# Patient Record
Sex: FEMALE | Race: WHITE
[De-identification: names, ages, dates, MRNs, and addresses within clinical notes are randomized per-mention and may not be internally consistent; named-entity substitution may affect disease eponyms.]

---

## 2019-06-16 ENCOUNTER — HOSPITAL ENCOUNTER (EMERGENCY)
Dept: HOSPITAL 25 - UCEAST | Age: 39
Discharge: HOME | End: 2019-06-16
Payer: COMMERCIAL

## 2019-06-16 VITALS — SYSTOLIC BLOOD PRESSURE: 118 MMHG | DIASTOLIC BLOOD PRESSURE: 77 MMHG

## 2019-06-16 DIAGNOSIS — R21: ICD-10-CM

## 2019-06-16 DIAGNOSIS — R50.9: Primary | ICD-10-CM

## 2019-06-16 DIAGNOSIS — Z88.1: ICD-10-CM

## 2019-06-16 PROCEDURE — 85025 COMPLETE CBC W/AUTO DIFF WBC: CPT

## 2019-06-16 PROCEDURE — 85060 BLOOD SMEAR INTERPRETATION: CPT

## 2019-06-16 PROCEDURE — 87798 DETECT AGENT NOS DNA AMP: CPT

## 2019-06-16 PROCEDURE — 87086 URINE CULTURE/COLONY COUNT: CPT

## 2019-06-16 PROCEDURE — 87651 STREP A DNA AMP PROBE: CPT

## 2019-06-16 PROCEDURE — 86618 LYME DISEASE ANTIBODY: CPT

## 2019-06-16 PROCEDURE — 86140 C-REACTIVE PROTEIN: CPT

## 2019-06-16 PROCEDURE — 99212 OFFICE O/P EST SF 10 MIN: CPT

## 2019-06-16 PROCEDURE — G0463 HOSPITAL OUTPT CLINIC VISIT: HCPCS

## 2019-06-16 PROCEDURE — 81003 URINALYSIS AUTO W/O SCOPE: CPT

## 2019-06-16 PROCEDURE — 36415 COLL VENOUS BLD VENIPUNCTURE: CPT

## 2019-06-16 NOTE — UC
HPI Febrile Illness





- HPI Summary


HPI Summary: 


39-year-old female comes in with a chief complaint of fever. about 3 days ago 

patient started with a fever she also had some posterior neck and head pain.  

Denies any rhinorrhea or sore throat or cough or chest congestion or burning 

with urination or abdominal pain or diarrhea or nausea or vomiting.  Fevers 

waxed and waned.  Patient has taken over-the-counter medications which do help 

the symptoms.  Headache at its worse is 7 out of 10 right now it's about 2 out 

of 10.  Denies any concern of abnormal vaginal discharge.  Patient noticed a 

rash today that developed on her abdomen and back.  She was feeling improved 

this morning and then the fever came back this evening.





- History of Current Complaint


Chief Complaint: UCGeneralIllness


Hx Last Menstrual Period: 6/11/19


Pain Intensity: 3





- Allergy/Home Medications


Allergies/Adverse Reactions: 


 Allergies











Allergy/AdvReac Type Severity Reaction Status Date / Time


 


sulfamethoxazole Allergy  BLUE LIPS Verified 06/16/19 20:46





[From Bactrim]     


 


trimethoprim [From Bactrim] Allergy  BLUE LIPS Verified 06/16/19 20:46











Home Medications: 


 Home Medications





Cholecalciferol TAB* [Vitamin D TAB*]  06/16/19 [History Confirmed 06/16/19]


Cyanocobalamin TAB* [Vitamin B12 TAB*]  06/16/19 [History]


Dm/PE/Acetaminophen/Doxylamine [Vicks Nyquil Severe Cold-Flu] 2 each PO ONCE 

PRN 06/16/19 [History Confirmed 06/16/19]


Iron  06/16/19 [History]











PMH/Surg Hx/FS Hx/Imm Hx


Previously Healthy: Yes





- Surgical History


Surgical History: Yes


Surgery Procedure, Year, and Place: BACK SURGERY- [facility].  AGE 5 - 

INGUINAL HERNIA REPAIR??





- Family History


Known Family History: Positive: Non-Contributory





- Social History


Alcohol Use: Occasionally


Substance Use Type: None


Smoking Status (MU): Never Smoked Tobacco





Review of Systems


All Other Systems Reviewed And Are Negative: Yes


Constitutional: Positive: Fever, Chills


Skin: Positive: Rash


Eyes: Positive: Negative


ENT: Positive: Negative


Respiratory: Positive: Negative


Cardiovascular: Positive: Negative


Gastrointestinal: Positive: Negative


Genitourinary: Positive: Negative


Motor: Positive: Negative


Neurovascular: Positive: Negative


Musculoskeletal: Positive: Myalgia


Neurological: Positive: Headache


Psychological: Positive: Negative


Is Patient Immunocompromised?: No





Physical Exam


Triage Information Reviewed: Yes


Appearance: No Pain Distress, Well-Nourished, Ill-Appearing - mild


Vital Signs: 


 Initial Vital Signs











Temp  100.8 F   06/16/19 20:41


 


Pulse  99   06/16/19 20:41


 


Resp  16   06/16/19 20:41


 


BP  118/77   06/16/19 20:41


 


Pulse Ox  100   06/16/19 20:41











Vital Signs Reviewed: Yes


Eye Exam: Normal


Eyes: Positive: Conjunctiva Clear, Other: - perrla/eomi,no photophobia


ENT: Positive: Pharynx normal, TMs normal


Neck: Positive: Supple, Nontender


Respiratory: Positive: Lungs clear, No respiratory distress


Cardiovascular: Positive: RRR


Abdomen Description: Positive: Nontender, Soft


Bowel Sounds: Positive: Present


Musculoskeletal Exam: Normal


Musculoskeletal: Positive: Strength Intact, ROM Intact


Neurological Exam: Normal


Neurological: Positive: Alert, Muscle Tone Normal


Psychological Exam: Normal


Psychological: Positive: Age Appropriate Behavior


Skin: Positive: Other - scattered flat erythematous 0.5 cm to 2cm patches of 

blanching rash on abd and back.





Course/Dx





- Course


Course Of Treatment: 


Patient has had an intermittent fever for 3 days.  Her neck pain is posterior 

on both sides and also her headache is occipital.  At worst it was a 7 out of 

10 now it's about a 2 out of 10.  Her neck is supple with full range of motion.

  There is minimal discomfort when she brings her chin to her chest.  She has 

no thoracic or lumbar back pain.  The headache is not circumferential.  We 

discussed the signs and symptoms of meningitis which clinically at this time 

the patient does not have.  I did let her know that if she develops signs of 

meningitis she should go to the emergency department right away.  The rash is 

blanching and not in any particular distribution it does not have central 

clearing however the possibility of Lyme disease.  We'll check a CRP CBC and 

Lyme screen.  We discussed symptomatic treatment.  We also discussed treatment 

with antibiotics.  At this time the plan is to treat with doxycycline and if 

her Lyme comes back positive she will continue it for a full 2 weeks.  

Otherwise follow-up as needed or if gets worse go the emergency department.





- Diagnoses


Provider Diagnosis: 


 Febrile illness, Rash








Discharge





- Sign-Out/Discharge


Documenting (check all that apply): Patient Departure


All imaging exams completed and their final reports reviewed: No Studies





- Discharge Plan


Condition: Stable


Disposition: HOME


Prescriptions: 


DOXYcycline CAP(*) [DOXYcycline 100MG CAP(*)] 100 mg PO BID #26 cap


Patient Education Materials:  Fever in Adults (ED), Acute Rash (ED)


Referrals: 


Rose Mary Mcwilliams MD [Primary Care Provider] - 


Additional Instructions: 


FOLLOW UP WITH YOUR DOCTOR IF NOT COMPLETELY IMPROVED.


GET RECHECKED SOONER IF YOUR CONDITION WORSENS OR ANY QUESTIONS OR CONCERNS.








- Billing Disposition and Condition


Condition: STABLE


Disposition: Home

## 2019-06-17 LAB
BASOPHILS # BLD AUTO: 0 10^3/UL (ref 0–0.2)
EOSINOPHIL # BLD AUTO: 0 10^3/UL (ref 0–0.6)
HCT VFR BLD AUTO: 40 % (ref 35–47)
HGB BLD-MCNC: 13.5 G/DL (ref 12–16)
LYMPHOCYTES # BLD AUTO: 1 10^3/UL (ref 1–4.8)
MCH RBC QN AUTO: 31 PG (ref 27–31)
MCHC RBC AUTO-ENTMCNC: 34 G/DL (ref 31–36)
MCV RBC AUTO: 91 FL (ref 80–97)
MONOCYTES # BLD AUTO: 0.5 10^3/UL (ref 0–0.8)
NEUTROPHILS # BLD AUTO: 2.8 10^3/UL (ref 1.5–7.7)
NRBC # BLD AUTO: 0 10^3/UL
NRBC BLD QL AUTO: 0.1
PLATELET # BLD AUTO: 149 10^3/UL (ref 150–450)
RBC # BLD AUTO: 4.41 10^6 /UL (ref 3.7–4.87)
WBC # BLD AUTO: 4.2 10^3/UL (ref 3.5–10.8)

## 2019-06-17 NOTE — UC
- Progress Note


Progress Note: 





Pt with normal wbc, slight bandemia 


elevated CRP


pt started on Doxy


please call pt - check how feeling


insure f/u with PCP 


if worse, should go to ed 


beni 6/17/19








Course/Dx





- Diagnoses


Provider Diagnoses: 


 Febrile illness, Rash








Discharge





- Sign-Out/Discharge


Documenting (check all that apply): Post-Discharge Follow Up


All imaging exams completed and their final reports reviewed: No Studies





- Discharge Plan


Condition: Stable


Disposition: HOME


Prescriptions: 


DOXYcycline CAP(*) [DOXYcycline 100MG CAP(*)] 100 mg PO BID #26 cap


Patient Education Materials:  Fever in Adults (ED), Acute Rash (ED)


Referrals: 


Rose Mary Mcwilliams MD [Primary Care Provider] - 


Additional Instructions: 


FOLLOW UP WITH YOUR DOCTOR IF NOT COMPLETELY IMPROVED.


GO TO THE EMERGENCY DEPARTMENT IF YOUR CONDITION WORSENS; CONTINUED FEVERS, 

HEADACHE, STIFF NECK, BACK PAIN, SYMPTOMS OF MENINGITIS, YOU FEEL ILL OR ANY 

QUESTIONS OR CONCERNS.








- Billing Disposition and Condition


Condition: STABLE


Disposition: Home

## 2019-06-18 NOTE — UC
- Progress Note


Progress Note: 





final urine cultre neg


no change


shantej 





Course/Dx





- Diagnoses


Provider Diagnoses: 


 Febrile illness, Rash








Discharge





- Sign-Out/Discharge


Documenting (check all that apply): Post-Discharge Follow Up


All imaging exams completed and their final reports reviewed: No Studies





- Discharge Plan


Condition: Stable


Disposition: HOME


Prescriptions: 


DOXYcycline CAP(*) [DOXYcycline 100MG CAP(*)] 100 mg PO BID #26 cap


Patient Education Materials:  Fever in Adults (ED), Acute Rash (ED)


Referrals: 


Rose Mary Mcwilliams MD [Primary Care Provider] - 


Additional Instructions: 


FOLLOW UP WITH YOUR DOCTOR IF NOT COMPLETELY IMPROVED.


GO TO THE EMERGENCY DEPARTMENT IF YOUR CONDITION WORSENS; CONTINUED FEVERS, 

HEADACHE, STIFF NECK, BACK PAIN, SYMPTOMS OF MENINGITIS, YOU FEEL ILL OR ANY 

QUESTIONS OR CONCERNS.








- Billing Disposition and Condition


Condition: STABLE


Disposition: Home